# Patient Record
Sex: MALE | URBAN - METROPOLITAN AREA
[De-identification: names, ages, dates, MRNs, and addresses within clinical notes are randomized per-mention and may not be internally consistent; named-entity substitution may affect disease eponyms.]

---

## 2019-08-28 ENCOUNTER — OFFICE VISIT (OUTPATIENT)
Dept: FAMILY MEDICINE CLINIC | Age: 19
End: 2019-08-28

## 2019-08-28 VITALS
WEIGHT: 162 LBS | DIASTOLIC BLOOD PRESSURE: 65 MMHG | OXYGEN SATURATION: 99 % | BODY MASS INDEX: 24.55 KG/M2 | HEIGHT: 68 IN | TEMPERATURE: 98.7 F | RESPIRATION RATE: 20 BRPM | HEART RATE: 60 BPM | SYSTOLIC BLOOD PRESSURE: 105 MMHG

## 2019-08-28 DIAGNOSIS — Z02.5 SPORTS PHYSICAL: Primary | ICD-10-CM

## 2019-08-28 NOTE — PROGRESS NOTES
Chief Complaint   Patient presents with    Physical     Kicker's physical exam     1. Have you been to the ER, urgent care clinic since your last visit? Hospitalized since your last visit? no    2. Have you seen or consulted any other health care providers outside of the [address] [place] [address] since your last visit? Include any pap smears or colon screening.  no    Visit Vitals  /65 (BP 1 Location: Left arm, BP Patient Position: Sitting)   Pulse 60   Temp 98.7 °F (37.1 °C) (Oral)   Resp 20   Ht 5' 8.31\" (1.735 m)   Wt 162 lb (73.5 kg)   SpO2 99%   BMI 24.41 kg/m²

## 2019-08-30 NOTE — PROGRESS NOTES
SUBJECTIVE:    Kiel Turner is a 22 y.o. male who presents for a pre-participation physical.  He is playing soccer for the "[employer] He is asymptomatic at rest and with exercise. 1. Chest pain, shortness of breath or wheezing with exercise: None  2. Syncope with exercise: None  3. History of heart murmur or HTN: None  4. Concussions or head injury: None  5. History of Seizure: None  6. Heat illness: None  7. Disqualifications or restrictions from sports participation in the past: None  8. Injuries to muscle, tendon, or ligament: None  9. Fractured bone: None  10. Stress fracture: None  11. Ongoing medical conditions: None  12. Ever needed an inhaler for exercise: No  13. Sickle Cell disease or trait: None  14. Surgeries: None  15. Any unpaired organs: None  16. Vision impairment: None   17. Glasses or Contacts: None  18. Hearing impairment: None  19. Weight changes: None   20. Trying to gain/lose weight: No    Family History:  1. CAD, MI, or sudden death before the age of 48: No  2. HTN: No  3. Diabetes: No  4. Asthma: No  5. Sickle cell trait or disease: No    No past medical history on file. No Known Allergies    OBJECTIVE:   Visit Vitals  /65 (BP 1 Location: Left arm, BP Patient Position: Sitting)   Pulse 60   Temp 98.7 °F (37.1 °C) (Oral)   Resp 20   Ht 5' 8.31\" (1.735 m)   Wt 162 lb (73.5 kg)   SpO2 99%   BMI 24.41 kg/m²     General: Alert and oriented, in no acute distress. Well nourished. SKIN: No rash. No suspicious lesions or moles. EYE: PERRL. Sclera and conjuctival clear. Extraocular movements intact. EARS: External normal, canals clear, tympanic membranes normal.   NOSE: Mucosa healthy without drainage or ulceration. OROPHARYNX: No suspicious lesions, normal dentition, pharynx, tongue and tonsils normal.  NECK: Supple; no masses; thyroid normal.  LUNGS: Respirations unlabored; clear to auscultation bilaterally.   CARDIOVASCULAR: Regular, rate, and rhythm without murmurs, gallops or rubs. ABDOMEN: Soft; nontender; nondistended; normoactive bowel sounds; no masses or organomegaly. LYMPH NODES: No significant cervial or inguinal lymphadenopathy. MUSCULOSKELETAL. NECK: FROM without pain. No cervical vertebral tenderness. BACK: FROM without pain. No obvious deformity. No vertebral tenderness. SHOULDER: FROM without pain. No AC, SC, or clavicle tenderness. RTC and deltoid strength 5/5 bilaterally. No joint laxity detected. ELBOW: FROM without pain. Flexion and extension strength 5/5 bilaterally. WRIST/HAND/FINGERS: FROM without pain.  strength 5/5 bilaterally. Wrist extension and flexion strength 5/5 bilaterally. HIP: FROM without pain. Flexion, extension, abduction, adduction strength 5/5 bilaterally. KNEE: FROM without pain. Flexion and extension strength 5/5 bilaterally. No joint laxity detected. ANKLE: FROM without pain. Flexion, extension, inversion, and eversion strength 5/5 bilaterally. No joint laxity detected. EXT: No edema. Neurovascularlly intact. Normal babs. ASSESSMENT:  Preparticipation physical exam demonstrates no reason for disqualification or restrictions. PLAN:    1. Patient is able to participate in physical activity without any restrictions.